# Patient Record
Sex: FEMALE | Race: AMERICAN INDIAN OR ALASKA NATIVE | NOT HISPANIC OR LATINO | Employment: PART TIME | URBAN - METROPOLITAN AREA
[De-identification: names, ages, dates, MRNs, and addresses within clinical notes are randomized per-mention and may not be internally consistent; named-entity substitution may affect disease eponyms.]

---

## 2022-06-06 ENCOUNTER — MEDICAL CORRESPONDENCE (OUTPATIENT)
Dept: HEALTH INFORMATION MANAGEMENT | Facility: CLINIC | Age: 42
End: 2022-06-06
Payer: MEDICAID

## 2022-06-07 ENCOUNTER — TRANSCRIBE ORDERS (OUTPATIENT)
Dept: OTHER | Age: 42
End: 2022-06-07

## 2022-06-07 ENCOUNTER — TELEPHONE (OUTPATIENT)
Dept: OPHTHALMOLOGY | Facility: CLINIC | Age: 42
End: 2022-06-07
Payer: MEDICAID

## 2022-06-07 DIAGNOSIS — H31.8 CHOROIDAL LESION: Primary | ICD-10-CM

## 2022-06-07 NOTE — TELEPHONE ENCOUNTER
Spoke to pt at 1350    Scheduled with Dr. Shearer July 25th in [facility] time slot at 1030 (45 minute time slot)    Pt aware of date/time/location at [facility]    Note to patient communicator to mail out new patient packet    -- pt lives in [place], [place]    Pt does not have [employer] insurance    --insurance information not in system and will ask patient communicator to call and update insurance    Sathya Peacock RN 1:58 PM 06/08/22            [phone] home/cell    Left message with direct number at [phone]    Will remove tentative scheduling until able to reach pt    Sathya Peacock RN 10:37 AM 06/08/22    ---      Left message with direct number at [phone]    Tentatively scheduled in Dr. Shearer eye diagnostic time July 25th in 45 minute time slot    Sathya Peacock RN 5:23 PM 06/07/22           [facility] Call Center    Phone Message    May a detailed message be left on voicemail: yes     Reason for Call: Appointment Intake    Referring Provider Name: Lyn Ballard MD in GENERIC EXTERNAL DATA DEPARTMENT  Diagnosis and/or Symptoms: Choroidal lesion [H31.8]    Protocols send High Priority TE    Action Taken: Message routed to:  [facility]    Travel Screening: Not Applicable

## 2022-06-09 ENCOUNTER — TELEPHONE (OUTPATIENT)
Dept: OPHTHALMOLOGY | Facility: CLINIC | Age: 42
End: 2022-06-09
Payer: MEDICAID

## 2022-06-09 NOTE — TELEPHONE ENCOUNTER
Called and LVM for Eliza to call  to update her insurance     Wilda Rodriguez Communication Facilitator on 6/9/2022 at 8:35 AM

## 2022-07-15 DIAGNOSIS — D31.31 CHOROIDAL NEVUS, RIGHT: Primary | ICD-10-CM

## 2022-07-25 ENCOUNTER — OFFICE VISIT (OUTPATIENT)
Dept: OPHTHALMOLOGY | Facility: CLINIC | Age: 42
End: 2022-07-25
Attending: OPHTHALMOLOGY
Payer: MEDICAID

## 2022-07-25 DIAGNOSIS — D31.31 NEVUS OF CHOROID OF RIGHT EYE: ICD-10-CM

## 2022-07-25 PROCEDURE — 92134 CPTRZ OPH DX IMG PST SGM RTA: CPT | Performed by: OPHTHALMOLOGY

## 2022-07-25 PROCEDURE — 76510 OPH US DX B-SCAN&QUAN A-SCAN: CPT | Performed by: OPHTHALMOLOGY

## 2022-07-25 PROCEDURE — 99204 OFFICE O/P NEW MOD 45 MIN: CPT | Mod: GC | Performed by: OPHTHALMOLOGY

## 2022-07-25 PROCEDURE — 99207 FUNDUS PHOTOS OU (BOTH EYES): CPT | Mod: 26 | Performed by: OPHTHALMOLOGY

## 2022-07-25 PROCEDURE — G0463 HOSPITAL OUTPT CLINIC VISIT: HCPCS

## 2022-07-25 PROCEDURE — 92250 FUNDUS PHOTOGRAPHY W/I&R: CPT | Performed by: OPHTHALMOLOGY

## 2022-07-25 RX ORDER — POTASSIUM CHLORIDE 1500 MG/1
20 TABLET, EXTENDED RELEASE ORAL
COMMUNITY
Start: 2022-01-03

## 2022-07-25 RX ORDER — BUPROPION HYDROCHLORIDE 300 MG/1
300 TABLET ORAL
COMMUNITY
Start: 2022-05-10

## 2022-07-25 RX ORDER — LANSOPRAZOLE 30 MG/1
CAPSULE, DELAYED RELEASE ORAL
COMMUNITY
Start: 2021-09-07

## 2022-07-25 RX ORDER — POLYETHYLENE GLYCOL 3350 17 G/17G
17 POWDER, FOR SOLUTION ORAL
COMMUNITY
Start: 2021-04-20

## 2022-07-25 RX ORDER — LABETALOL 300 MG/1
300 TABLET, FILM COATED ORAL
COMMUNITY
Start: 2022-01-25 | End: 2023-04-28

## 2022-07-25 RX ORDER — MELATONIN 5 MG
5-10 TABLET,CHEWABLE ORAL
COMMUNITY

## 2022-07-25 RX ORDER — FLUTICASONE PROPIONATE 50 MCG
1 SPRAY, SUSPENSION (ML) NASAL
COMMUNITY
Start: 2021-03-31

## 2022-07-25 RX ORDER — VENLAFAXINE 75 MG/1
75 TABLET ORAL
COMMUNITY
Start: 2021-09-14

## 2022-07-25 RX ORDER — GABAPENTIN 300 MG/1
300 CAPSULE ORAL
COMMUNITY
Start: 2022-07-14 | End: 2024-04-30

## 2022-07-25 RX ORDER — ZOLMITRIPTAN 5 MG/1
5 TABLET, FILM COATED ORAL
COMMUNITY
Start: 2021-11-16 | End: 2024-04-30

## 2022-07-25 RX ORDER — ERENUMAB-AOOE 70 MG/ML
70 INJECTION SUBCUTANEOUS
COMMUNITY
Start: 2021-11-16 | End: 2023-04-28

## 2022-07-25 RX ORDER — ARIPIPRAZOLE 20 MG/1
15 TABLET ORAL
COMMUNITY
Start: 2021-09-14

## 2022-07-25 ASSESSMENT — VISUAL ACUITY
CORRECTION_TYPE: GLASSES
OD_CC: 20/30
OS_CC: 20/30
METHOD: SNELLEN - LINEAR

## 2022-07-25 ASSESSMENT — TONOMETRY
OD_IOP_MMHG: 15
OS_IOP_MMHG: 17
IOP_METHOD: TONOPEN

## 2022-07-25 ASSESSMENT — REFRACTION_WEARINGRX
SPECS_TYPE: SVL
OD_AXIS: 170
OS_CYLINDER: SPHERE
OD_CYLINDER: +0.50
OS_SPHERE: +1.00
OD_SPHERE: +1.00

## 2022-07-25 ASSESSMENT — SLIT LAMP EXAM - LIDS
COMMENTS: NORMAL
COMMENTS: NORMAL

## 2022-07-25 ASSESSMENT — CONF VISUAL FIELD
OS_NORMAL: 1
OD_NORMAL: 1
METHOD: COUNTING FINGERS

## 2022-07-25 ASSESSMENT — CUP TO DISC RATIO
OS_RATIO: 0.1
OD_RATIO: 0.1

## 2022-07-25 ASSESSMENT — EXTERNAL EXAM - LEFT EYE: OS_EXAM: NORMAL

## 2022-07-25 ASSESSMENT — EXTERNAL EXAM - RIGHT EYE: OD_EXAM: NORMAL

## 2022-07-25 NOTE — NURSING NOTE
Chief Complaints and History of Present Illnesses   Patient presents with     Retinal Evaluation     Chief Complaint(s) and History of Present Illness(es)     Retinal Evaluation     Laterality: right eye    Course: stable    Associated symptoms: flashes and floaters.  Negative for eye pain              Comments     42yo female here for evaluation of choroidal lesion in right eye. Last eye exam was 2 months ago. Vision is stable. Stable flashes and floaters. No eye pain. No gtts.    Abraham Abdi COT 10:16 AM July 25, 2022

## 2022-07-25 NOTE — PROGRESS NOTES
CC -  Choroidal Nevus OD    INTERVAL HISTORY - Initial visit, referred by Dr. Rosita Ballard for eval    PMH -   Elizabrandon Dvaid is a  41 year old year-old patient referred for suspicious nevus OD  First noted 5/2022, RIGOBERTO was 6 years prior to that ?DFE uncertain  No DM  No h/o CA    She has PMHx HTN, migraine and one mole on right forearm with unremarkable POhx who about 1 year ago developed flashes in the right eye intermittently that appears to cover all of her right eye; she noticed it when driving. Occurs 2-3 times a week. She sees several floaters in the right eye as well, also 1 year ago when flashes began.   She was seen by Dr Rosita Ballard in [place] who found a choroidal mass in the right eye and referred her here due to concerns for choroidal melanoma.     PAST OCULAR SURGERY  None      RETINAL IMAGING:  OCT   OD -  Macula - retina normal, elevation IN macula, thick choroid, PHF attached   nevus - Elevated choroidal lesion inf to ONH mild/mod SRF, RPE atrophy at apex  OS -  Macula - retina normal, thick choroid, PHF attached    U/S 7/25/22  A-scan - med/high reflective  B-scan - elevated lesion 2.12 x 6.45T x 6.82L      ASSESSMENT & PLAN    # Choroidal nevus OD   - new diagnosis 5/2022, not noted in 2016   - peripapillary, size > 2 mm, + SRF   - small basal diameter, high reflective      - given new diagnosis monitor closely   - recheck 3 months        # Syneresis OU          return to clinic: 3 months, DFE OU, Optos OU, OCT OU, U/S OD    My privilege to be part of your care,  Jose Roberto Ballard MD, MSc  Ophthalmology PGY-3 resident physician  Pager: [phone]      ATTESTATION     Attending Attestation:     Complete documentation of historical and exam elements from today's encounter can be found in the full encounter summary report (not reduplicated in this progress note).  I personally obtained the chief complaint(s) and history of present illness.  I confirmed and edited as necessary the review of systems, past  medical/surgical history, family history, social history, and examination findings as documented by others; and I examined the patient myself.  I personally reviewed the relevant tests, images, and reports as documented above.  I personally reviewed the ophthalmic test(s) associated with this encounter, agree with the interpretation(s) as documented by the resident/fellow, and have edited the corresponding report(s) as necessary.   I formulated and edited as necessary the assessment and plan and discussed the findings and management plan with the patient and family    Sherri Shearer MD, PhD  , Vitreoretinal Surgery  Department of Ophthalmology  [facility]

## 2022-07-25 NOTE — LETTER
7/25/2022       RE: Eliza David  [address]  [place] [address]     Dear Colleague,    Thank you for referring your patient, Eliza David, to the [facility] [place] at [facility]. Please see a copy of my visit note below.    CC -  Choroidal Nevus OD    INTERVAL HISTORY - Initial visit, referred by Dr. Rosita Ballard for eval.    PMH -   Eliza David is a  41 year old year-old patient referred for suspicious nevus OD  First noted 5/2022, RIGOBERTO was 6 years prior to that ?DFE uncertain  No DM  No h/o CA    She has PMHx HTN, migraine and one mole on right forearm with unremarkable POhx who about 1 year ago developed flashes in the right eye intermittently that appears to cover all of her right eye; she noticed it when driving. Occurs 2-3 times a week. She sees several floaters in the right eye as well, also 1 year ago when flashes began.   She was seen by Dr Rosita Ballard in [place] who found a choroidal mass in the right eye and referred her here due to concerns for choroidal melanoma.     PAST OCULAR SURGERY  None    RETINAL IMAGING:  OCT   OD -  Macula - retina normal, elevation IN macula, thick choroid, PHF attached   nevus - Elevated choroidal lesion inf to ONH mild/mod SRF, RPE atrophy at apex  OS -  Macula - retina normal, thick choroid, PHF attached    U/S 7/25/22  A-scan - med/high reflective  B-scan - elevated lesion 2.12 x 6.45T x 6.82L    ASSESSMENT & PLAN    # Choroidal nevus OD   - new diagnosis 5/2022, not noted in 2016   - peripapillary, size > 2 mm, + SRF   - small basal diameter, high reflective      - given new diagnosis monitor closely   - recheck 3 months      # Syneresis OU      return to clinic: 3 months, DFE OU, Optos OU, OCT OU, U/S OD    My privilege to be part of your care,  Jose Roberto Ballard MD, MSc  Ophthalmology PGY-3 resident physician  Pager: [phone]      ATTESTATION     Attending Attestation: Complete documentation of historical and exam  elements from today's encounter can be found in the full encounter summary report (not reduplicated in this progress note).  I personally obtained the chief complaint(s) and history of present illness.  I confirmed and edited as necessary the review of systems, past medical/surgical history, family history, social history, and examination findings as documented by others; and I examined the patient myself.  I personally reviewed the relevant tests, images, and reports as documented above.  I personally reviewed the ophthalmic test(s) associated with this encounter, agree with the interpretation(s) as documented by the resident/fellow, and have edited the corresponding report(s) as necessary.   I formulated and edited as necessary the assessment and plan and discussed the findings and management plan with the patient and family. -Sherri Shearer MD, PhD      Base Eye Exam     Visual Acuity (Snellen - Linear)       Right Left    Dist cc 20/30 20/30    Correction: Glasses          Tonometry (Tonopen, 10:25 AM)       Right Left    Pressure 15 17          Pupils       Light Shape React APD    Right 3 Round Minimal None    Left 3 Round Minimal None          Visual Fields (Counting fingers)       Left Right     Full Full          Extraocular Movement       Right Left     Full Full          Neuro/Psych     Oriented x3: Yes    Mood/Affect: Normal          Dilation     Both eyes: 1.0% Mydriacyl, 2.5% Daron Synephrine @ 10:28 AM            Slit Lamp and Fundus Exam     External Exam       Right Left    External Normal Normal          Slit Lamp Exam       Right Left    Lids/Lashes Normal Normal    Conjunctiva/Sclera White and quiet White and quiet    Cornea Clear Clear    Anterior Chamber Deep and quiet Deep and quiet    Iris Dilated Dilated    Lens Clear phakic Clear phakic    Vitreous syneresis syneresis          Fundus Exam       Right Left    Disc Normal Normal    C/D Ratio 0.1 0.1    Macula Good FLR, normal Good FLR,  normal    Vessels Normal Normal    Periphery peripapillary pig mod elevated nevus ~ 1 mm inf to disk ~ 4x4 mm Normal, no CR lesions, no nevi elsewhere            Refraction     Wearing Rx       Sphere Cylinder Axis    Right +1.00 +0.50 170    Left +1.00 Sphere     Type: SVL                Again, thank you for allowing me to participate in the care of your patient.      Sincerely,    Sherri Shearer MD, PhD  , Vitreoretinal Surgery  Department of Ophthalmology  [facility]

## 2022-08-23 ENCOUNTER — MYC MEDICAL ADVICE (OUTPATIENT)
Dept: OPHTHALMOLOGY | Facility: CLINIC | Age: 42
End: 2022-08-23

## 2022-08-23 NOTE — TELEPHONE ENCOUNTER
Spoke to pt at 1725    Pt with h/o choroidal nevus in right eye and previously noted vitreous syneresis on last note in July    Pt reporting new quick 'streamers of light' in past 7-10 days that last about second    No new floaters    Pt feels vision progressively worsening    Pt has retina provider in [place] where pt lives    Recommended seeing Dr. Ballard in [place] this week and may contact Dr. Shearer/angeli with information and assistance with plan of care following exam    Pt seemed comfortable with information/plan     Sathya Peacock RN 5:32 PM 08/23/22

## 2022-10-03 ENCOUNTER — HEALTH MAINTENANCE LETTER (OUTPATIENT)
Age: 42
End: 2022-10-03

## 2022-10-12 DIAGNOSIS — D31.31 NEVUS OF CHOROID OF RIGHT EYE: Primary | ICD-10-CM

## 2022-10-28 ENCOUNTER — OFFICE VISIT (OUTPATIENT)
Dept: OPHTHALMOLOGY | Facility: CLINIC | Age: 42
End: 2022-10-28
Attending: OPHTHALMOLOGY
Payer: MEDICAID

## 2022-10-28 DIAGNOSIS — D31.31 NEVUS OF CHOROID OF RIGHT EYE: ICD-10-CM

## 2022-10-28 PROCEDURE — 99214 OFFICE O/P EST MOD 30 MIN: CPT | Performed by: OPHTHALMOLOGY

## 2022-10-28 PROCEDURE — 99207 FUNDUS PHOTOS OU (BOTH EYES): CPT | Mod: 26 | Performed by: OPHTHALMOLOGY

## 2022-10-28 PROCEDURE — 92134 CPTRZ OPH DX IMG PST SGM RTA: CPT | Performed by: OPHTHALMOLOGY

## 2022-10-28 PROCEDURE — G0463 HOSPITAL OUTPT CLINIC VISIT: HCPCS | Mod: 25

## 2022-10-28 PROCEDURE — 76510 OPH US DX B-SCAN&QUAN A-SCAN: CPT | Performed by: OPHTHALMOLOGY

## 2022-10-28 PROCEDURE — 92250 FUNDUS PHOTOGRAPHY W/I&R: CPT | Performed by: OPHTHALMOLOGY

## 2022-10-28 ASSESSMENT — CONF VISUAL FIELD
OS_NORMAL: 1
OD_SUPERIOR_NASAL_RESTRICTION: 0
OS_INFERIOR_TEMPORAL_RESTRICTION: 0
OS_SUPERIOR_TEMPORAL_RESTRICTION: 0
OD_SUPERIOR_TEMPORAL_RESTRICTION: 0
OD_INFERIOR_NASAL_RESTRICTION: 0
METHOD: COUNTING FINGERS
OD_INFERIOR_TEMPORAL_RESTRICTION: 0
OS_INFERIOR_NASAL_RESTRICTION: 0
OD_NORMAL: 1
OS_SUPERIOR_NASAL_RESTRICTION: 0

## 2022-10-28 ASSESSMENT — EXTERNAL EXAM - RIGHT EYE: OD_EXAM: NORMAL

## 2022-10-28 ASSESSMENT — VISUAL ACUITY
OD_SC: 20/25
OS_SC: 20/25
METHOD: SNELLEN - LINEAR

## 2022-10-28 ASSESSMENT — TONOMETRY
OD_IOP_MMHG: 17
IOP_METHOD: TONOPEN
OS_IOP_MMHG: 17

## 2022-10-28 ASSESSMENT — EXTERNAL EXAM - LEFT EYE: OS_EXAM: NORMAL

## 2022-10-28 ASSESSMENT — SLIT LAMP EXAM - LIDS
COMMENTS: NORMAL
COMMENTS: NORMAL

## 2022-10-28 NOTE — NURSING NOTE
Chief Complaints and History of Present Illnesses   Patient presents with     Jose. Neopl Choroid/nevus Follow Up     Chief Complaint(s) and History of Present Illness(es)     Jose. Neopl Choroid/nevus Follow Up            Laterality: right eye    Course: stable    Associated symptoms: flashes          Comments    Here for choroidal nevus follow up right eye. Vision is about the same. She notices intermittent black spots in the right spot slightly different from floaters. Stable floaters and no flashes. No gtts.    Abraham Abdi COT 9:23 AM October 28, 2022

## 2022-10-28 NOTE — PROGRESS NOTES
CC -  Choroidal Nevus OD    INTERVAL HISTORY - sees occasional dark [place] in vision OD near center to left of center few minutes then resolves      PMH -   Eliza INDIGO David is a  42 year old year-old patient referred for suspicious nevus OD  referred by Dr. Rosita Ballard for eval  First noted 5/2022, RIGOBERTO was 6 years prior to that ?DFE uncertain  No DM  No h/o CA    She has PMHx HTN, migraine and one mole on right forearm with unremarkable POhx who about 1 year ago developed flashes in the right eye intermittently that appears to cover all of her right eye; she noticed it when driving. Occurs 2-3 times a week. She sees several floaters in the right eye as well, also 1 year ago when flashes began.   She was seen by Dr Rosita Ballard in [place] who found a choroidal mass in the right eye and referred her here due to concerns for choroidal melanoma.     PAST OCULAR SURGERY  None      RETINAL IMAGING:  OCT   OD -  Macula - retina normal, elevation IN macula, thick choroid, PHF attached   nevus - Elevated choroidal lesion inf to ONH mild/mod SRF, RPE atrophy at apex  OS -  Macula - retina normal, thick choroid, PHF attached    U/S 10/28/22   A-scan - med/high reflective  B-scan - elevated lesion 2.12 x 6.45T x 6.82L (7/25/22) ->  2.09mm x 6.49T x  6.83L (10/28/22)      ASSESSMENT & PLAN    # Choroidal nevus OD   - new diagnosis 5/2022, not noted in 2016   - peripapillary, size > 2 mm, + SRF   - small basal diameter, high reflective      - no change today c/t 7/2022      - given new diagnosis monitor closely   - recheck 6 months        # Syneresis OU   - advised S/Sx RD 10/2022      # transient scotoma OD  ` - starting ~ 8/2022   - transient, no findings on exam   - monitor          return to clinic: 6 months, DFE OU, Optos OU, OCT OU, U/S OD      ATTESTATION     Attending Attestation:     Complete documentation of historical and exam elements from today's encounter can be found in the full encounter summary report (not  reduplicated in this progress note).  I personally obtained the chief complaint(s) and history of present illness.  I confirmed and edited as necessary the review of systems, past medical/surgical history, family history, social history, and examination findings as documented by others; and I examined the patient myself.  I personally reviewed the relevant tests, images, and reports as documented above.  I formulated and edited as necessary the assessment and plan and discussed the findings and management plan with the patient and family    Sherri Shearer MD, PhD  , Vitreoretinal Surgery  Department of Ophthalmology  [facility]

## 2023-04-13 DIAGNOSIS — D31.31 NEVUS OF CHOROID OF RIGHT EYE: Primary | ICD-10-CM

## 2023-04-28 ENCOUNTER — OFFICE VISIT (OUTPATIENT)
Dept: OPHTHALMOLOGY | Facility: CLINIC | Age: 43
End: 2023-04-28
Attending: STUDENT IN AN ORGANIZED HEALTH CARE EDUCATION/TRAINING PROGRAM
Payer: COMMERCIAL

## 2023-04-28 DIAGNOSIS — D31.31 NEVUS OF CHOROID OF RIGHT EYE: ICD-10-CM

## 2023-04-28 PROCEDURE — 99207 FUNDUS PHOTOS OU (BOTH EYES): CPT | Mod: 26 | Performed by: OPHTHALMOLOGY

## 2023-04-28 PROCEDURE — 92134 CPTRZ OPH DX IMG PST SGM RTA: CPT | Performed by: OPHTHALMOLOGY

## 2023-04-28 PROCEDURE — 92250 FUNDUS PHOTOGRAPHY W/I&R: CPT | Performed by: OPHTHALMOLOGY

## 2023-04-28 PROCEDURE — 76510 OPH US DX B-SCAN&QUAN A-SCAN: CPT | Performed by: OPHTHALMOLOGY

## 2023-04-28 PROCEDURE — G0463 HOSPITAL OUTPT CLINIC VISIT: HCPCS | Performed by: OPHTHALMOLOGY

## 2023-04-28 PROCEDURE — 99213 OFFICE O/P EST LOW 20 MIN: CPT | Performed by: OPHTHALMOLOGY

## 2023-04-28 ASSESSMENT — CONF VISUAL FIELD
OS_NORMAL: 1
OD_INFERIOR_NASAL_RESTRICTION: 0
OS_INFERIOR_NASAL_RESTRICTION: 0
OD_SUPERIOR_TEMPORAL_RESTRICTION: 0
OS_SUPERIOR_TEMPORAL_RESTRICTION: 0
OD_INFERIOR_TEMPORAL_RESTRICTION: 0
OD_SUPERIOR_NASAL_RESTRICTION: 0
OS_INFERIOR_TEMPORAL_RESTRICTION: 0
OD_NORMAL: 1
METHOD: COUNTING FINGERS
OS_SUPERIOR_NASAL_RESTRICTION: 0

## 2023-04-28 ASSESSMENT — TONOMETRY
OS_IOP_MMHG: 20
OD_IOP_MMHG: 19
IOP_METHOD: TONOPEN

## 2023-04-28 ASSESSMENT — VISUAL ACUITY
OD_CC: 20/30
METHOD: SNELLEN - LINEAR
OS_CC: 20/25
OS_CC+: -3

## 2023-04-28 ASSESSMENT — REFRACTION_WEARINGRX
OD_AXIS: 170
OS_CYLINDER: SPHERE
OS_SPHERE: +1.00
SPECS_TYPE: SVL
OD_SPHERE: +1.00
OD_CYLINDER: +0.50

## 2023-04-28 ASSESSMENT — EXTERNAL EXAM - RIGHT EYE: OD_EXAM: NORMAL

## 2023-04-28 ASSESSMENT — SLIT LAMP EXAM - LIDS
COMMENTS: NORMAL
COMMENTS: NORMAL

## 2023-04-28 ASSESSMENT — EXTERNAL EXAM - LEFT EYE: OS_EXAM: NORMAL

## 2023-04-28 NOTE — NURSING NOTE
Chief Complaints and History of Present Illnesses   Patient presents with     Jose. Neopl Choroid/nevus Follow Up     Chief Complaint(s) and History of Present Illness(es)     Jose. Neopl Choroid/nevus Follow Up            Laterality: right eye    Course: gradually worsening    Associated symptoms: flashes and blurred vision    Pain scale: 0/10          Comments    Eliza is here to continue care for Nevus of choroid of right eye. She feels RE is worsening. She sees floaters RE with flashes more frequently.    Ashok Arizmendi COT 10:06 AM April 28, 2023

## 2023-04-28 NOTE — PROGRESS NOTES
CC -  Choroidal Nevus OD    INTERVAL HISTORY -OD more floaters (dozens)      PMH -   Elizabrandon David is a  42 year old year-old patient referred for suspicious nevus OD  referred by Dr. Rosita Ballard for eval  First noted 5/2022, RIGOBERTO was 6 years prior to that ?DFE uncertain  No DM  No h/o CA    She has PMHx HTN, migraine and one mole on right forearm with unremarkable POhx who about 1 year ago developed flashes in the right eye intermittently that appears to cover all of her right eye; she noticed it when driving. Occurs 2-3 times a week. She sees several floaters in the right eye as well, also 1 year ago when flashes began.   She was seen by Dr Rosita Ballard in [place] who found a choroidal mass in the right eye and referred her here due to concerns for choroidal melanoma.     PAST OCULAR SURGERY  None      RETINAL IMAGING:  OCT   OD -  Macula - retina normal, elevation IN macula, thick choroid, PHF attached   nevus - Elevated choroidal lesion inf to ONH mild/mod SRF, RPE atrophy at apex  OS -  Macula - retina normal, thick choroid, PHF attached    U/S 10/28/22   A-scan - med/high reflective  B-scan - elevated lesion 2.12 x 6.45T x 6.82L (7/25/22) ->  2.09mm x 6.49T x  6.83L (10/28/22) ->2.09mm x 7.40T x 6.87L (04/28/23)      ASSESSMENT & PLAN    # Choroidal nevus OD   - new diagnosis 5/2022, not noted in 2016   - peripapillary, size > 2 mm, + SRF   - small basal diameter, high reflective      - no change today c/t 7/2022      - given new diagnosis & size monitor closely   - recheck 6 months        # Syneresis OU   - advised S/Sx RD 10/2022      # transient scotoma OD  ` - starting ~ 8/2022   - transient, no findings on exam   - monitor          return to clinic: 6 months, DFE OU, Optos OU, OCT OU, U/S OD      ATTESTATION     Attending Attestation:     Complete documentation of historical and exam elements from today's encounter can be found in the full encounter summary report (not reduplicated in this progress note).   I personally obtained the chief complaint(s) and history of present illness.  I confirmed and edited as necessary the review of systems, past medical/surgical history, family history, social history, and examination findings as documented by others; and I examined the patient myself.  I personally reviewed the relevant tests, images, and reports as documented above.  I formulated and edited as necessary the assessment and plan and discussed the findings and management plan with the patient and family    Sherri Shearer MD, PhD  , Vitreoretinal Surgery  Department of Ophthalmology  [facility]

## 2023-10-20 DIAGNOSIS — D31.31 NEVUS OF CHOROID OF RIGHT EYE: Primary | ICD-10-CM

## 2023-10-22 ENCOUNTER — HEALTH MAINTENANCE LETTER (OUTPATIENT)
Age: 43
End: 2023-10-22

## 2023-10-31 ENCOUNTER — OFFICE VISIT (OUTPATIENT)
Dept: OPHTHALMOLOGY | Facility: CLINIC | Age: 43
End: 2023-10-31
Attending: OPHTHALMOLOGY
Payer: COMMERCIAL

## 2023-10-31 DIAGNOSIS — D31.31 NEVUS OF CHOROID OF RIGHT EYE: ICD-10-CM

## 2023-10-31 PROCEDURE — 92250 FUNDUS PHOTOGRAPHY W/I&R: CPT | Performed by: OPHTHALMOLOGY

## 2023-10-31 PROCEDURE — 92134 CPTRZ OPH DX IMG PST SGM RTA: CPT | Performed by: OPHTHALMOLOGY

## 2023-10-31 PROCEDURE — 99207 FUNDUS PHOTOS OU (BOTH EYES): CPT | Mod: 26 | Performed by: OPHTHALMOLOGY

## 2023-10-31 PROCEDURE — 99214 OFFICE O/P EST MOD 30 MIN: CPT | Performed by: OPHTHALMOLOGY

## 2023-10-31 PROCEDURE — 76510 OPH US DX B-SCAN&QUAN A-SCAN: CPT | Performed by: OPHTHALMOLOGY

## 2023-10-31 PROCEDURE — 99214 OFFICE O/P EST MOD 30 MIN: CPT | Mod: GC | Performed by: OPHTHALMOLOGY

## 2023-10-31 ASSESSMENT — REFRACTION_WEARINGRX
OS_CYLINDER: SPHERE
OD_AXIS: 170
OS_SPHERE: +1.00
OD_CYLINDER: +0.50
OD_SPHERE: +1.00
SPECS_TYPE: SVL

## 2023-10-31 ASSESSMENT — TONOMETRY
IOP_METHOD: TONOPEN
OS_IOP_MMHG: 19
OD_IOP_MMHG: 21

## 2023-10-31 ASSESSMENT — CONF VISUAL FIELD
OD_INFERIOR_NASAL_RESTRICTION: 0
OS_INFERIOR_NASAL_RESTRICTION: 0
OD_INFERIOR_TEMPORAL_RESTRICTION: 0
OS_NORMAL: 1
OD_NORMAL: 1
OS_INFERIOR_TEMPORAL_RESTRICTION: 0
OD_SUPERIOR_NASAL_RESTRICTION: 0
OS_SUPERIOR_NASAL_RESTRICTION: 0
OD_SUPERIOR_TEMPORAL_RESTRICTION: 0
OS_SUPERIOR_TEMPORAL_RESTRICTION: 0
METHOD: COUNTING FINGERS

## 2023-10-31 ASSESSMENT — VISUAL ACUITY
OD_PH_CC: 20/30
OS_PH_CC+: +2
CORRECTION_TYPE: GLASSES
OS_CC: 20/60
OD_CC: 20/40
METHOD: SNELLEN - LINEAR
OD_PH_CC+: +2
OS_PH_CC: 20/30

## 2023-10-31 ASSESSMENT — CUP TO DISC RATIO
OS_RATIO: 0.25
OD_RATIO: 0.25

## 2023-10-31 ASSESSMENT — EXTERNAL EXAM - LEFT EYE: OS_EXAM: NORMAL

## 2023-10-31 ASSESSMENT — SLIT LAMP EXAM - LIDS
COMMENTS: NORMAL
COMMENTS: NORMAL

## 2023-10-31 ASSESSMENT — EXTERNAL EXAM - RIGHT EYE: OD_EXAM: NORMAL

## 2023-10-31 NOTE — PROGRESS NOTES
CC -  Choroidal Nevus OD    INTERVAL HISTORY -Pt states vision appears worse in both eyes. Pt has new prescription for glasses from last month, but has not filled it yet.   Headache like pain in RE for the past few weeks. Pt has pain in RE daily, worse when she looks left or right, feels like RE is being strained.  Occasional floaters in LE that comes and goes. New flashes in RE, increasing since last summer.  occasional blurry episodes OS that last minutes, occasional flashes with episodes    No DM.      PMH -   Eliza David is a  43 year old year-old patient referred for suspicious nevus OD  referred by Dr. Rosita Ballard for eval  First noted 5/2022, RIGOBERTO was 6 years prior to that ?DFE uncertain  No DM  No h/o CA    She has PMHx HTN, migraine and one mole on right forearm with unremarkable POhx who about 1 year ago developed flashes in the right eye intermittently that appears to cover all of her right eye; she noticed it when driving. Occurs 2-3 times a week. She sees several floaters in the right eye as well, also 1 year ago when flashes began.   She was seen by Dr Rosita Ballard in [place] who found a choroidal mass in the right eye and referred her here due to concerns for choroidal melanoma.     PAST OCULAR SURGERY  None      RETINAL IMAGING:  OCT 10/31/2023  OD -  Macula - retina normal, elevation IN macula, thick choroid, PHF attached   nevus - Elevated choroidal lesion inf to ONH mild/mod SRF, RPE atrophy at apex  OS -  Macula - retina normal, thick choroid, PHF attached    U/S   A-scan - med/high reflective  B-scan - elevated lesion 2.12 x 6.45T x 6.82L (7/25/22) ->  2.09mm x 6.49T x  6.83L (10/28/22) ->2.09mm x 7.40T x 6.87L (04/28/23)->2.07 x 6.51T x 6.86L (10/31/23)      ASSESSMENT & PLAN    # Choroidal nevus OD   - new diagnosis 5/2022, not noted in 2016   - peripapillary, size > 2 mm, + SRF   - small basal diameter, high reflective, no lipofuscin      - no change today c/t 7/2022      - given new  diagnosis & size monitor closely   - recheck 6 months        # Syneresis OU   - advised S/Sx RD 10/2023      # transient scotoma OD  ` - starting ~ 8/2022   - transient, no findings on exam   - monitor      Return in about 6 months (around 4/30/2024) for Tech Instructions:, OCT OU, Optos Photo, Clarus Photo, US OD.       Ayush Raymundo MD MPH  Vitreoretinal Fellow PGY-6  [facility]         ATTESTATION     Attending Attestation:     Complete documentation of historical and exam elements from today's encounter can be found in the full encounter summary report (not reduplicated in this progress note).  I personally obtained the chief complaint(s) and history of present illness.  I confirmed and edited as necessary the review of systems, past medical/surgical history, family history, social history, and examination findings as documented by others; and I examined the patient myself.  I personally reviewed the relevant tests, images, and reports as documented above.  I personally reviewed the ophthalmic test(s) associated with this encounter, agree with the interpretation(s) as documented by the resident/fellow, and have edited the corresponding report(s) as necessary.   I formulated and edited as necessary the assessment and plan and discussed the findings and management plan with the patient and family    Sherri Shearer MD, PhD  , Vitreoretinal Surgery  Department of Ophthalmology  [facility]

## 2023-10-31 NOTE — NURSING NOTE
Chief Complaints and History of Present Illnesses   Patient presents with    Follow Up     6 month follow up  Choroidal nevus OD     Chief Complaint(s) and History of Present Illness(es)       Follow Up              Comments: 6 month follow up  Choroidal nevus OD              Comments    Pt states vision appears worse in both eyes. Pt has new prescription for glasses from last month, but has not filled it yet.   Headache like pain in RE for the past few weeks. Pt has pain in RE daily, worse when she looks left or right, feels like RE is being strained.  Occasional floaters in LE that comes and goes. New flashes in RE, increasing since last summer.  No DM.    MADELYN Baldwin October 31, 2023 9:37 AM

## 2024-04-15 DIAGNOSIS — D31.31 NEVUS OF CHOROID OF RIGHT EYE: Primary | ICD-10-CM

## 2024-04-30 ENCOUNTER — OFFICE VISIT (OUTPATIENT)
Dept: OPHTHALMOLOGY | Facility: CLINIC | Age: 44
End: 2024-04-30
Attending: OPHTHALMOLOGY
Payer: COMMERCIAL

## 2024-04-30 DIAGNOSIS — D31.31 NEVUS OF CHOROID OF RIGHT EYE: ICD-10-CM

## 2024-04-30 PROCEDURE — 92134 CPTRZ OPH DX IMG PST SGM RTA: CPT | Performed by: OPHTHALMOLOGY

## 2024-04-30 PROCEDURE — 92250 FUNDUS PHOTOGRAPHY W/I&R: CPT | Performed by: OPHTHALMOLOGY

## 2024-04-30 PROCEDURE — 99214 OFFICE O/P EST MOD 30 MIN: CPT | Performed by: OPHTHALMOLOGY

## 2024-04-30 PROCEDURE — 76510 OPH US DX B-SCAN&QUAN A-SCAN: CPT | Performed by: OPHTHALMOLOGY

## 2024-04-30 PROCEDURE — 99207 FUNDUS PHOTOS OU (BOTH EYES): CPT | Mod: 26 | Performed by: OPHTHALMOLOGY

## 2024-04-30 ASSESSMENT — CUP TO DISC RATIO
OD_RATIO: 0.25
OS_RATIO: 0.25

## 2024-04-30 ASSESSMENT — CONF VISUAL FIELD
METHOD: COUNTING FINGERS
OS_SUPERIOR_NASAL_RESTRICTION: 0
OS_SUPERIOR_TEMPORAL_RESTRICTION: 0
OD_SUPERIOR_NASAL_RESTRICTION: 0
OS_NORMAL: 1
OS_INFERIOR_NASAL_RESTRICTION: 0
OD_NORMAL: 1
OS_INFERIOR_TEMPORAL_RESTRICTION: 0
OD_INFERIOR_NASAL_RESTRICTION: 0
OD_INFERIOR_TEMPORAL_RESTRICTION: 0
OD_SUPERIOR_TEMPORAL_RESTRICTION: 0

## 2024-04-30 ASSESSMENT — SLIT LAMP EXAM - LIDS
COMMENTS: NORMAL
COMMENTS: NORMAL

## 2024-04-30 ASSESSMENT — REFRACTION_WEARINGRX
OS_CYLINDER: +0.25
OS_SPHERE: +0.00
OD_CYLINDER: +0.75
OS_AXIS: 075
OD_AXIS: 179
OD_SPHERE: +0.00
SPECS_TYPE: PHOROPTER

## 2024-04-30 ASSESSMENT — EXTERNAL EXAM - LEFT EYE: OS_EXAM: NORMAL

## 2024-04-30 ASSESSMENT — VISUAL ACUITY
METHOD: SNELLEN - LINEAR
OS_CC+: -3
OS_CC: 20/20
OD_CC: 20/25
OD_CC+: -2

## 2024-04-30 ASSESSMENT — EXTERNAL EXAM - RIGHT EYE: OD_EXAM: NORMAL

## 2024-04-30 ASSESSMENT — TONOMETRY
OD_IOP_MMHG: 19
IOP_METHOD: TONOPEN
OS_IOP_MMHG: 16

## 2024-04-30 NOTE — PROGRESS NOTES
CC -  Choroidal Nevus OD    INTERVAL HISTORY - notes worse vision since RIGOBERTO with me, intermittent horizontal (?binocular) diplopia, near > far      PMH -   Eliza INDIGO David is a  43 year old year-old patient referred for suspicious nevus OD  referred by Dr. Rosita Ballard for eval  First noted 5/2022, RIGOBERTO was 6 years prior to that ?DFE uncertain  No DM  No h/o CA    She has PMHx HTN, migraine and one mole on right forearm with unremarkable POhx who about 1 year ago developed flashes in the right eye intermittently that appears to cover all of her right eye; she noticed it when driving. Occurs 2-3 times a week. She sees several floaters in the right eye as well, also 1 year ago when flashes began.   She was seen by Dr Rosita Ballard in [place] who found a choroidal mass in the right eye and referred her here due to concerns for choroidal melanoma.     PAST OCULAR SURGERY  None      RETINAL IMAGING:  OCT 04/30/2024  OD -  Macula - retina normal, elevation IN macula, thick choroid, PHF attached   nevus - Elevated choroidal lesion inf to ONH mild SRF, RPE atrophy at apex - stable  OS -  Macula - retina normal, thick choroid, PHF attached    U/S   A-scan - med/high reflective  B-scan - elevated lesion 2.12 x 6.45T x 6.82L (7/25/22) ->  2.09mm x 6.49T x  6.83L (10/28/22) ->2.09mm x 7.40T x 6.87L (04/28/23)->2.07 x 6.51T x 6.86L (10/31/23) -> 2.06 x 6.52T x 6.86L (4/2024)      ASSESSMENT & PLAN    # Choroidal nevus OD   - new diagnosis 5/2022, not noted in 2016   - peripapillary, size > 2 mm, + SRF   - small basal diameter, high reflective, no lipofuscin      - no change today c/t 7/2022      - given stability recheck 9-12 months        # Syneresis OU   - advised S/Sx RD 4/2024   -           # transient scotoma OD  ` - starting ~ 8/2022   - transient, no findings on exam   - monitor      # Transient diplopia OD noted 4/2024   - ?binocular   - EOM normal on gross inspection today (4/2024)   - retina and SLE no pathology  evident   - ?d/t refractive error OD   - advised to f/u with general ophthalmology locally soon    Return in about 1 year (around 4/30/2025) for DFE OU, OCT OU, Clarus Photo, US OD.           ATTESTATION     Attending Attestation:     Complete documentation of historical and exam elements from today's encounter can be found in the full encounter summary report (not reduplicated in this progress note).  I personally obtained the chief complaint(s) and history of present illness.  I confirmed and edited as necessary the review of systems, past medical/surgical history, family history, social history, and examination findings as documented by others; and I examined the patient myself.  I personally reviewed the relevant tests, images, and reports as documented above.  I formulated and edited as necessary the assessment and plan and discussed the findings and management plan with the patient and family    Sehrri Shearer MD, PhD  , Vitreoretinal Surgery  Department of Ophthalmology  [facility]

## 2024-04-30 NOTE — NURSING NOTE
Chief Complaints and History of Present Illnesses   Patient presents with    Follow Up     Nevus of choroid of right eye     Chief Complaint(s) and History of Present Illness(es)       Follow Up              Laterality: right eye    Course: stable    Associated symptoms: dryness, flashes and floaters.  Negative for eye pain    Treatments tried: artificial tears    Pain scale: 0/10    Comments: Nevus of choroid of right eye              Comments    Her vision seems intermittent cloudiness in her right eye.  She sees some flashing and floaters in that eye as well.    She states that both eyes have been feeling dry and uncomfortable at times.  She does use Blink artificial tears as needed.    She states that she got a new glasses prescription several months ago, but has not been able to get the new glasses, so she has not been wearing glasses.    Lizett Rivers, COT 10:21 AM  April 30, 2024

## 2024-12-15 ENCOUNTER — HEALTH MAINTENANCE LETTER (OUTPATIENT)
Age: 44
End: 2024-12-15

## 2025-01-28 DIAGNOSIS — D31.31 NEVUS OF CHOROID OF RIGHT EYE: Primary | ICD-10-CM

## 2025-04-14 ENCOUNTER — OFFICE VISIT (OUTPATIENT)
Dept: OPHTHALMOLOGY | Facility: CLINIC | Age: 45
End: 2025-04-14
Attending: OPHTHALMOLOGY
Payer: COMMERCIAL

## 2025-04-14 DIAGNOSIS — D31.31 NEVUS OF CHOROID OF RIGHT EYE: ICD-10-CM

## 2025-04-14 PROCEDURE — 92134 CPTRZ OPH DX IMG PST SGM RTA: CPT | Performed by: OPHTHALMOLOGY

## 2025-04-14 PROCEDURE — 99213 OFFICE O/P EST LOW 20 MIN: CPT | Performed by: OPHTHALMOLOGY

## 2025-04-14 PROCEDURE — 76510 OPH US DX B-SCAN&QUAN A-SCAN: CPT | Performed by: OPHTHALMOLOGY

## 2025-04-14 PROCEDURE — 92250 FUNDUS PHOTOGRAPHY W/I&R: CPT | Performed by: OPHTHALMOLOGY

## 2025-04-14 RX ORDER — DEXTROAMPHETAMINE SACCHARATE, AMPHETAMINE ASPARTATE MONOHYDRATE, DEXTROAMPHETAMINE SULFATE AND AMPHETAMINE SULFATE 7.5; 7.5; 7.5; 7.5 MG/1; MG/1; MG/1; MG/1
30 CAPSULE, EXTENDED RELEASE ORAL
COMMUNITY
Start: 2025-06-01 | End: 2025-06-30

## 2025-04-14 ASSESSMENT — TONOMETRY
OD_IOP_MMHG: 19
IOP_METHOD: TONOPEN
OS_IOP_MMHG: 19

## 2025-04-14 ASSESSMENT — VISUAL ACUITY
OD_SC+: +2
OS_SC: 20/25
OD_SC: 20/25
OS_SC+: -2
METHOD: SNELLEN - LINEAR

## 2025-04-14 ASSESSMENT — EXTERNAL EXAM - LEFT EYE: OS_EXAM: NORMAL

## 2025-04-14 ASSESSMENT — CUP TO DISC RATIO
OS_RATIO: 0.25
OD_RATIO: 0.25

## 2025-04-14 ASSESSMENT — EXTERNAL EXAM - RIGHT EYE: OD_EXAM: NORMAL

## 2025-04-14 ASSESSMENT — SLIT LAMP EXAM - LIDS
COMMENTS: NORMAL
COMMENTS: NORMAL

## 2025-04-14 NOTE — NURSING NOTE
Chief Complaints and History of Present Illnesses   Patient presents with    Retinal Follow Up     Chief Complaint(s) and History of Present Illness(es)       Retinal Follow Up               Comments    Patient states no changes since her last visit. Occasional shooting pain in RE. Some dryness and irritation, pt uses artificial tears. Some floaters, no flashes of light.    Ocular Meds: Artificial tears as needed around MELBA FONSECA 9:11 AM April 14, 2025 \

## 2025-04-14 NOTE — PROGRESS NOTES
CC -  Choroidal Nevus OD    INTERVAL HISTORY - saw general optometry in [place], Dr Elam, concern for spot on visual field        PMH -   Elizabrandon David is a  44 year old year-old patient referred for suspicious nevus OD  referred by Dr. Rosita Ballard for eval  First noted 5/2022, RIGOBERTO was 6 years prior to that ?DFE uncertain  No DM  No h/o CA    She has PMHx HTN, migraine and one mole on right forearm with unremarkable POhx who about 1 year ago developed flashes in the right eye intermittently that appears to cover all of her right eye; she noticed it when driving. Occurs 2-3 times a week. She sees several floaters in the right eye as well, also 1 year ago when flashes began.   She was seen by Dr Rosita Ballard in [place] who found a choroidal mass in the right eye and referred her here due to concerns for choroidal melanoma.     PAST OCULAR SURGERY  None      RETINAL IMAGING:  OCT 04/14/2025  OD -  Macula - retina normal, elevation IN macula, thick choroid, PHF attached   nevus - Elevated choroidal lesion inf to ON[facility] mild SRF, RPE atrophy at apex - stable  OS -  Macula - retina normal, thick choroid, PHF attached    U/S   A-scan - med/high reflective  B-scan - elevated lesion 2.12 x 6.45T x 6.82L (7/25/22) ->  2.09mm x 6.49T x  6.83L (10/28/22) ->2.09mm x 7.40T x 6.87L (04/28/23)->2.07 x 6.51T x 6.86L (10/31/23) -> 2.06 x 6.52T x 6.86L (4/2024)-> 2.05 x 6.53T x 6.89L (4/2025)      ASSESSMENT & PLAN    # Choroidal nevus OD   - new diagnosis 5/2022, not noted in 2016   - peripapillary, size > 2 mm, + SRF   - small basal diameter, high reflective, no lipofuscin      - no change today c/t 7/2022      - given stability recheck 12 months        # Syneresis OU   - advised S/Sx RD 4/2025         # transient scotoma OD  ` - starting ~ 8/2022   - transient, no findings on exam   - monitor      # Transient diplopia OD noted 4/2024   - ?binocular   - followed locally    Return in about 1 year (around 4/14/2026) for DFE OU,  OCT OU, Clarus [employer], US OD.           ATTESTATION     Attending Attestation:     Complete documentation of historical and exam elements from today's encounter can be found in the full encounter summary report (not reduplicated in this progress note).  I personally obtained the chief complaint(s) and history of present illness.  I confirmed and edited as necessary the review of systems, past medical/surgical history, family history, social history, and examination findings as documented by others; and I examined the patient myself.  I personally reviewed the relevant tests, images, and reports as documented above.  I formulated and edited as necessary the assessment and plan and discussed the findings and management plan with the patient and family    Sherri Shearer MD, PhD  , Vitreoretinal Surgery  Department of Ophthalmology  [facility]

## 2025-04-14 NOTE — LETTER
4/14/2025       RE: Eliza David  [address]  [place] [address]     Dear Colleague,    Thank you for referring your patient, Eliza David, to the [facility] [place] at [facility]. Please see a copy of my visit note below.    CC -  Choroidal Nevus OD    INTERVAL HISTORY - saw general optometry in [place], Dr Elam, concern for spot on visual field        PMH -   Eliza David is a  44 year old year-old patient referred for suspicious nevus OD  referred by Dr. Rosita Ballard for eval  First noted 5/2022, RIGOBERTO was 6 years prior to that ?DFE uncertain  No DM  No h/o CA    She has PMHx HTN, migraine and one mole on right forearm with unremarkable POhx who about 1 year ago developed flashes in the right eye intermittently that appears to cover all of her right eye; she noticed it when driving. Occurs 2-3 times a week. She sees several floaters in the right eye as well, also 1 year ago when flashes began.   She was seen by Dr Rosita Ballard in [place] who found a choroidal mass in the right eye and referred her here due to concerns for choroidal melanoma.     PAST OCULAR SURGERY  None      RETINAL IMAGING:  OCT 04/14/2025  OD -  Macula - retina normal, elevation IN macula, thick choroid, PHF attached   nevus - Elevated choroidal lesion inf to ONH mild SRF, RPE atrophy at apex - stable  OS -  Macula - retina normal, thick choroid, PHF attached    U/S   A-scan - med/high reflective  B-scan - elevated lesion 2.12 x 6.45T x 6.82L (7/25/22) ->  2.09mm x 6.49T x  6.83L (10/28/22) ->2.09mm x 7.40T x 6.87L (04/28/23)->2.07 x 6.51T x 6.86L (10/31/23) -> 2.06 x 6.52T x 6.86L (4/2024)-> 2.05 x 6.53T x 6.89L (4/2025)      ASSESSMENT & PLAN    # Choroidal nevus OD   - new diagnosis 5/2022, not noted in 2016   - peripapillary, size > 2 mm, + SRF   - small basal diameter, high reflective, no lipofuscin      - no change today c/t 7/2022      - given stability recheck 12 months        #  Syneresis OU   - advised S/Sx RD 4/2025         # transient scotoma OD  ` - starting ~ 8/2022   - transient, no findings on exam   - monitor      # Transient diplopia OD noted 4/2024   - ?binocular   - followed locally    Return in about 1 year (around 4/14/2026) for DFE OU, OCT OU, Clarus [employer], US OD.       ATTESTATION   Attending Attestation:  Complete documentation of historical and exam elements from today's encounter can be found in the full encounter summary report (not reduplicated in this progress note).  I personally obtained the chief complaint(s) and history of present illness.  I confirmed and edited as necessary the review of systems, past medical/surgical history, family history, social history, and examination findings as documented by others; and I examined the patient myself.  I personally reviewed the relevant tests, images, and reports as documented above.  I formulated and edited as necessary the assessment and plan and discussed the findings and management plan with the patient and family. Sherri Shearer MD, PhD      Base Eye Exam       Visual Acuity (Snellen - Linear)         Right Left    Dist sc 20/25 +2 20/25 -2   Pt does not wear glasses although she has a RX             Tonometry (Tonopen, 9:18 AM)         Right Left    Pressure 19 19              Pupils         Pupils Dark Light Shape React APD    Right PERRL 2 2 Round Minimal None    Left PERRL 2 2 Round Minimal None              Neuro/Psych       Oriented x3: Yes    Mood/Affect: Normal              Dilation       Both eyes: Tropicamide 1%, 0.17 mL, 2.5% Phenylephrine @ 9:18 AM                  Slit Lamp and Fundus Exam       External Exam         Right Left    External Normal Normal              Slit Lamp Exam         Right Left    Lids/Lashes Normal Normal    Conjunctiva/Sclera White and quiet White and quiet    Cornea Clear Clear    Anterior Chamber Deep and quiet Deep and quiet    Iris Dilated Dilated    Lens tr NS tr NS     Vitreous syneresis syneresis              Fundus Exam         Right Left    Disc Normal Normal    C/D Ratio 0.25 0.25    Macula Good FLR, normal Good FLR, normal    Vessels Normal Normal    Periphery peripapillary pig mod elevated nevus ~ 1 mm inf to disk ~ 4x4 mm Normal                    Again, thank you for allowing me to participate in the care of your patient.      Sincerely,    Sherri Shearer MD, PhD  , Vitreoretinal Surgery  Department of Ophthalmology & Visual Neurosciences  [facility]